# Patient Record
(demographics unavailable — no encounter records)

---

## 2025-03-06 NOTE — HISTORY OF PRESENT ILLNESS
[Y] : Positive pregnancy history [Menarche Age: ____] : age at menarche was [unfilled] [No] : Patient does not have concerns regarding sex [Men] : men [postmenopausal] : postmenopausal [Previously active] : previously active [N] : Patient reports normal menses [TextBox_4] : Well woman visit.  c/o urinary sx  urinary incontinence- known cystocele. Patient has yet to go to Urogyn. [Mammogramdate] : 06/26/24 [TextBox_19] : BR2 [PapSmeardate] : 07/20/23 [TextBox_31] : NEG [ColonoscopyDate] : 2020 [HPVDate] : 07/20/23 [TextBox_78] : NEG [LMPDate] : 2012 [PGHxTotal] : 2 [[facility] : 2 [[facility]] : 2 [FreeTextEntry1] : 2012

## 2025-03-06 NOTE — COUNSELING
[Nutrition/ Exercise/ Weight Management] : nutrition, exercise, weight management [Breast Self Exam] : breast self exam [Bladder Hygiene] : bladder hygiene [Confidentiality] : confidentiality

## 2025-03-06 NOTE — PLAN
[FreeTextEntry1] : Well woman visit completed.  Mammogram uptodate at this time.  referral given to urogyn to cystocele, stressed to followup

## 2025-03-06 NOTE — PHYSICAL EXAM
[Chaperone Present] : A chaperone was present in the examining room during all aspects of the physical examination [Appropriately responsive] : appropriately responsive [Alert] : alert [No Acute Distress] : no acute distress [Soft] : soft [Non-tender] : non-tender [Non-distended] : non-distended [No HSM] : No HSM [No Lesions] : no lesions [No Mass] : no mass [Oriented x3] : oriented x3 [Labia Majora] : normal [Labia Minora] : normal [Normal] : normal [Uterine Adnexae] : normal [FreeTextEntry3] : large cystocele noted [FreeTextEntry8] : negative